# Patient Record
Sex: MALE | Race: WHITE | Employment: OTHER | URBAN - METROPOLITAN AREA
[De-identification: names, ages, dates, MRNs, and addresses within clinical notes are randomized per-mention and may not be internally consistent; named-entity substitution may affect disease eponyms.]

---

## 2018-02-21 ENCOUNTER — APPOINTMENT (OUTPATIENT)
Dept: GENERAL RADIOLOGY | Age: 58
End: 2018-02-21
Payer: MEDICARE

## 2018-02-21 ENCOUNTER — HOSPITAL ENCOUNTER (EMERGENCY)
Age: 58
Discharge: HOME | End: 2018-02-21
Attending: EMERGENCY MEDICINE
Payer: MEDICARE

## 2018-02-21 VITALS
TEMPERATURE: 98.1 F | OXYGEN SATURATION: 99 % | WEIGHT: 185 LBS | BODY MASS INDEX: 26.48 KG/M2 | HEART RATE: 86 BPM | RESPIRATION RATE: 15 BRPM | HEIGHT: 70 IN | SYSTOLIC BLOOD PRESSURE: 165 MMHG | DIASTOLIC BLOOD PRESSURE: 86 MMHG

## 2018-02-21 DIAGNOSIS — F10.90 HABITUAL ALCOHOL USE: ICD-10-CM

## 2018-02-21 DIAGNOSIS — F17.200 SMOKING ADDICTION: ICD-10-CM

## 2018-02-21 DIAGNOSIS — R60.0 BILATERAL LOWER EXTREMITY EDEMA: Primary | ICD-10-CM

## 2018-02-21 DIAGNOSIS — Z76.0 MEDICINE REFILL: ICD-10-CM

## 2018-02-21 LAB
-: NORMAL
ABSOLUTE EOS #: 0.3 K/UL (ref 0–0.4)
ABSOLUTE IMMATURE GRANULOCYTE: ABNORMAL K/UL (ref 0–0.3)
ABSOLUTE LYMPH #: 1.5 K/UL (ref 1–4.8)
ABSOLUTE MONO #: 0.7 K/UL (ref 0.1–1.3)
ALBUMIN SERPL-MCNC: 2 G/DL (ref 3.5–5.2)
ALBUMIN/GLOBULIN RATIO: ABNORMAL (ref 1–2.5)
ALP BLD-CCNC: 128 U/L (ref 40–129)
ALT SERPL-CCNC: 76 U/L (ref 5–41)
AMORPHOUS: NORMAL
ANION GAP SERPL CALCULATED.3IONS-SCNC: 6 MMOL/L (ref 9–17)
AST SERPL-CCNC: 174 U/L
BACTERIA: NORMAL
BASOPHILS # BLD: 1 % (ref 0–2)
BASOPHILS ABSOLUTE: 0.1 K/UL (ref 0–0.2)
BILIRUB SERPL-MCNC: 1.64 MG/DL (ref 0.3–1.2)
BILIRUBIN URINE: NEGATIVE
BNP INTERPRETATION: NORMAL
BUN BLDV-MCNC: 10 MG/DL (ref 6–20)
BUN/CREAT BLD: ABNORMAL (ref 9–20)
CALCIUM SERPL-MCNC: 8.3 MG/DL (ref 8.6–10.4)
CASTS UA: NORMAL /LPF
CHLORIDE BLD-SCNC: 103 MMOL/L (ref 98–107)
CO2: 29 MMOL/L (ref 20–31)
COLOR: YELLOW
COMMENT UA: ABNORMAL
CREAT SERPL-MCNC: 0.59 MG/DL (ref 0.7–1.2)
CRYSTALS, UA: NORMAL /HPF
DIFFERENTIAL TYPE: ABNORMAL
EKG ATRIAL RATE: 82 BPM
EKG P AXIS: 13 DEGREES
EKG P-R INTERVAL: 92 MS
EKG Q-T INTERVAL: 382 MS
EKG QRS DURATION: 94 MS
EKG QTC CALCULATION (BAZETT): 446 MS
EKG R AXIS: 74 DEGREES
EKG T AXIS: 53 DEGREES
EKG VENTRICULAR RATE: 82 BPM
EOSINOPHILS RELATIVE PERCENT: 6 % (ref 0–4)
EPITHELIAL CELLS UA: NORMAL /HPF
GFR AFRICAN AMERICAN: >60 ML/MIN
GFR NON-AFRICAN AMERICAN: >60 ML/MIN
GFR SERPL CREATININE-BSD FRML MDRD: ABNORMAL ML/MIN/{1.73_M2}
GFR SERPL CREATININE-BSD FRML MDRD: ABNORMAL ML/MIN/{1.73_M2}
GLUCOSE BLD-MCNC: 116 MG/DL (ref 70–99)
GLUCOSE URINE: NEGATIVE
HCT VFR BLD CALC: 44 % (ref 41–53)
HEMOGLOBIN: 15.2 G/DL (ref 13.5–17.5)
IMMATURE GRANULOCYTES: ABNORMAL %
KETONES, URINE: NEGATIVE
LEUKOCYTE ESTERASE, URINE: NEGATIVE
LYMPHOCYTES # BLD: 30 % (ref 24–44)
MCH RBC QN AUTO: 37.4 PG (ref 26–34)
MCHC RBC AUTO-ENTMCNC: 34.4 G/DL (ref 31–37)
MCV RBC AUTO: 108.7 FL (ref 80–100)
MONOCYTES # BLD: 14 % (ref 1–7)
MUCUS: NORMAL
NITRITE, URINE: NEGATIVE
NRBC AUTOMATED: ABNORMAL PER 100 WBC
OTHER OBSERVATIONS UA: NORMAL
PDW BLD-RTO: 15.6 % (ref 11.5–14.9)
PH UA: 7 (ref 5–8)
PLATELET # BLD: 102 K/UL (ref 150–450)
PLATELET ESTIMATE: ABNORMAL
PMV BLD AUTO: 7.7 FL (ref 6–12)
POTASSIUM SERPL-SCNC: 4 MMOL/L (ref 3.7–5.3)
PRO-BNP: 231 PG/ML
PROTEIN UA: ABNORMAL
RBC # BLD: 4.05 M/UL (ref 4.5–5.9)
RBC # BLD: ABNORMAL 10*6/UL
RBC UA: NORMAL /HPF
RENAL EPITHELIAL, UA: NORMAL /HPF
SEG NEUTROPHILS: 49 % (ref 36–66)
SEGMENTED NEUTROPHILS ABSOLUTE COUNT: 2.4 K/UL (ref 1.3–9.1)
SODIUM BLD-SCNC: 138 MMOL/L (ref 135–144)
SPECIFIC GRAVITY UA: 1.01 (ref 1–1.03)
TOTAL PROTEIN: 6.4 G/DL (ref 6.4–8.3)
TRICHOMONAS: NORMAL
TROPONIN INTERP: NORMAL
TROPONIN INTERP: NORMAL
TROPONIN T: <0.03 NG/ML
TROPONIN T: <0.03 NG/ML
TURBIDITY: CLEAR
URINE HGB: NEGATIVE
UROBILINOGEN, URINE: ABNORMAL
WBC # BLD: 4.9 K/UL (ref 3.5–11)
WBC # BLD: ABNORMAL 10*3/UL
WBC UA: NORMAL /HPF
YEAST: NORMAL

## 2018-02-21 PROCEDURE — 93005 ELECTROCARDIOGRAM TRACING: CPT

## 2018-02-21 PROCEDURE — 99284 EMERGENCY DEPT VISIT MOD MDM: CPT

## 2018-02-21 PROCEDURE — 36415 COLL VENOUS BLD VENIPUNCTURE: CPT

## 2018-02-21 PROCEDURE — 6360000002 HC RX W HCPCS: Performed by: EMERGENCY MEDICINE

## 2018-02-21 PROCEDURE — 84484 ASSAY OF TROPONIN QUANT: CPT

## 2018-02-21 PROCEDURE — 81001 URINALYSIS AUTO W/SCOPE: CPT

## 2018-02-21 PROCEDURE — 83880 ASSAY OF NATRIURETIC PEPTIDE: CPT

## 2018-02-21 PROCEDURE — 96374 THER/PROPH/DIAG INJ IV PUSH: CPT

## 2018-02-21 PROCEDURE — 80053 COMPREHEN METABOLIC PANEL: CPT

## 2018-02-21 PROCEDURE — 71046 X-RAY EXAM CHEST 2 VIEWS: CPT

## 2018-02-21 PROCEDURE — 85025 COMPLETE CBC W/AUTO DIFF WBC: CPT

## 2018-02-21 RX ORDER — FUROSEMIDE 10 MG/ML
40 INJECTION INTRAMUSCULAR; INTRAVENOUS ONCE
Status: COMPLETED | OUTPATIENT
Start: 2018-02-21 | End: 2018-02-21

## 2018-02-21 RX ORDER — LISINOPRIL 10 MG/1
10 TABLET ORAL DAILY
Qty: 15 TABLET | Refills: 0 | Status: SHIPPED | OUTPATIENT
Start: 2018-02-21 | End: 2018-03-08

## 2018-02-21 RX ADMIN — FUROSEMIDE 40 MG: 10 INJECTION, SOLUTION INTRAVENOUS at 19:07

## 2018-02-21 ASSESSMENT — ENCOUNTER SYMPTOMS
DIARRHEA: 0
NAUSEA: 0
BACK PAIN: 0
VOMITING: 0
ABDOMINAL PAIN: 0
COUGH: 0
SHORTNESS OF BREATH: 0
SORE THROAT: 0
EYE PAIN: 0

## 2018-02-21 ASSESSMENT — PAIN SCALES - GENERAL: PAINLEVEL_OUTOF10: 9

## 2018-02-21 NOTE — FLOWSHEET NOTE
02/21/18 1720   Encounter Summary   Services provided to: Patient   Referral/Consult From: Rounding   Continue Visiting (2/21/2018)   Complexity of Encounter Moderate   Length of Encounter 15 minutes   Routine   Type Initial   Assessment Calm   Intervention Sustaining presence/ Ministry of presence   Outcome Acceptance   Pt waiting to see the doctor. Chaplains will remain available to offer spiritual and emotional support as needed.

## 2018-02-21 NOTE — ED PROVIDER NOTES
patient has a history of daily drinking, possibly related to liver dysfunction and hypoalbuminemia. We'll get cardiac evaluation including BNP 4 CHF although less likely. EKG is normal, electrolytes show no gross abnormalities, LFTs are within normal limits. This is likely dependent edema. We will give one dose of Lasix here, provide follow up with PCP, compression stockings, return for development of new symptoms such as chest pain shortness of breath. Patient instructed to follow-up with PCP or return to the nearest emergency department for uncontrolled fevers, vomiting, shortness of breath, chest pain, or any other concerns. Patient acknowledges plan, voices understanding of it and is in agreement with it. CRITICAL CARE:   The patient admits to smoking. 3 minutes of time was spent discussing how this can worsen underlying breathing problems, COPD hypertension and heart disease or cause them to develop. CONSULTS:  None      FINAL IMPRESSION      1. Bilateral lower extremity edema    2. Smoking addiction    3. Habitual alcohol use    4.  Medicine refill          DISPOSITION/PLAN:  Discharge    PATIENT REFERRED TO:  [facility] ED  [facility] [address] [place] [id number]  [phone]  Go to   If symptoms worsen, As needed      DISCHARGE MEDICATIONS:  Discharge Medication List as of 2/21/2018  7:00 PM      START taking these medications    Details   Elastic Bandages & Supports ([address]) [address] PRN Starting Wed 2/21/2018, Disp-3 each, R-0, Print             (Please note that portions of this note were completed with a voice recognition program.  Efforts were made to edit the dictations but occasionally words are mis-transcribed.)    Yareli Cruz MD  Attending Emergency Physician            Yareli Cruz MD  02/23/18 [id number]

## 2020-07-10 ENCOUNTER — HOSPITAL ENCOUNTER (EMERGENCY)
Age: 60
Discharge: ANOTHER HOSPITAL | End: 2020-07-10
Attending: EMERGENCY MEDICINE
Payer: COMMERCIAL

## 2020-07-10 ENCOUNTER — APPOINTMENT (OUTPATIENT)
Dept: GENERAL RADIOLOGY | Age: 60
End: 2020-07-10
Payer: COMMERCIAL

## 2020-07-10 ENCOUNTER — APPOINTMENT (OUTPATIENT)
Dept: CT IMAGING | Age: 60
End: 2020-07-10
Payer: COMMERCIAL

## 2020-07-10 VITALS
HEIGHT: 70 IN | WEIGHT: 189 LBS | DIASTOLIC BLOOD PRESSURE: 56 MMHG | OXYGEN SATURATION: 100 % | HEART RATE: 72 BPM | BODY MASS INDEX: 27.06 KG/M2 | SYSTOLIC BLOOD PRESSURE: 128 MMHG | TEMPERATURE: 97.6 F | RESPIRATION RATE: 24 BRPM

## 2020-07-10 LAB
ABO/RH: NORMAL
ABSOLUTE EOS #: 0 K/UL (ref 0–0.4)
ABSOLUTE IMMATURE GRANULOCYTE: 0.07 K/UL (ref 0–0.3)
ABSOLUTE LYMPH #: 0.98 K/UL (ref 1–4.8)
ABSOLUTE MONO #: 0.63 K/UL (ref 0.2–0.8)
ALBUMIN SERPL-MCNC: 3.1 G/DL (ref 3.5–5.2)
ALBUMIN/GLOBULIN RATIO: ABNORMAL (ref 1–2.5)
ALP BLD-CCNC: 129 U/L (ref 40–129)
ALT SERPL-CCNC: 46 U/L (ref 5–41)
ANION GAP SERPL CALCULATED.3IONS-SCNC: 12 MMOL/L (ref 9–17)
ANION GAP SERPL CALCULATED.3IONS-SCNC: 14 MMOL/L (ref 9–17)
ANTIBODY SCREEN: NEGATIVE
ARM BAND NUMBER: NORMAL
AST SERPL-CCNC: 113 U/L
BASOPHILS # BLD: 0 %
BASOPHILS ABSOLUTE: 0 K/UL (ref 0–0.2)
BILIRUB SERPL-MCNC: 2.14 MG/DL (ref 0.3–1.2)
BNP INTERPRETATION: NORMAL
BUN BLDV-MCNC: 29 MG/DL (ref 8–23)
BUN BLDV-MCNC: 31 MG/DL (ref 8–23)
BUN/CREAT BLD: 12 (ref 9–20)
BUN/CREAT BLD: 13 (ref 9–20)
CALCIUM SERPL-MCNC: 8.5 MG/DL (ref 8.6–10.4)
CALCIUM SERPL-MCNC: 9.1 MG/DL (ref 8.6–10.4)
CHLORIDE BLD-SCNC: 94 MMOL/L (ref 98–107)
CHLORIDE BLD-SCNC: 94 MMOL/L (ref 98–107)
CO2: 20 MMOL/L (ref 20–31)
CO2: 22 MMOL/L (ref 20–31)
CREAT SERPL-MCNC: 2.36 MG/DL (ref 0.7–1.2)
CREAT SERPL-MCNC: 2.5 MG/DL (ref 0.7–1.2)
DIFFERENTIAL TYPE: ABNORMAL
EKG ATRIAL RATE: 92 BPM
EKG P AXIS: 62 DEGREES
EKG P-R INTERVAL: 152 MS
EKG Q-T INTERVAL: 360 MS
EKG QRS DURATION: 94 MS
EKG QTC CALCULATION (BAZETT): 445 MS
EKG R AXIS: 79 DEGREES
EKG T AXIS: 82 DEGREES
EKG VENTRICULAR RATE: 92 BPM
EOSINOPHILS RELATIVE PERCENT: 0 % (ref 1–4)
ETHANOL PERCENT: <0.01 %
ETHANOL: <10 MG/DL
EXPIRATION DATE: NORMAL
GFR AFRICAN AMERICAN: 32 ML/MIN
GFR AFRICAN AMERICAN: 34 ML/MIN
GFR NON-AFRICAN AMERICAN: 26 ML/MIN
GFR NON-AFRICAN AMERICAN: 28 ML/MIN
GFR SERPL CREATININE-BSD FRML MDRD: ABNORMAL ML/MIN/{1.73_M2}
GLUCOSE BLD-MCNC: 117 MG/DL (ref 70–99)
GLUCOSE BLD-MCNC: 181 MG/DL (ref 70–99)
HCT VFR BLD CALC: 39 % (ref 40.7–50.3)
HEMOGLOBIN: 13.6 G/DL (ref 13–17)
IMMATURE GRANULOCYTES: 1 %
INR BLD: 1.4
LACTIC ACID: 2.7 MMOL/L (ref 0.5–2.2)
LYMPHOCYTES # BLD: 14 % (ref 24–44)
MCH RBC QN AUTO: 39.9 PG (ref 25.2–33.5)
MCHC RBC AUTO-ENTMCNC: 34.9 G/DL (ref 28.4–34.8)
MCV RBC AUTO: 114.4 FL (ref 82.6–102.9)
MONOCYTES # BLD: 9 % (ref 1–7)
MORPHOLOGY: ABNORMAL
NRBC AUTOMATED: 0 PER 100 WBC
PARTIAL THROMBOPLASTIN TIME: 34.5 SEC (ref 23.9–33.8)
PDW BLD-RTO: 14.8 % (ref 11.8–14.4)
PLATELET # BLD: 134 K/UL (ref 138–453)
PLATELET ESTIMATE: ABNORMAL
PMV BLD AUTO: 9.4 FL (ref 8.1–13.5)
POTASSIUM SERPL-SCNC: 5.5 MMOL/L (ref 3.7–5.3)
POTASSIUM SERPL-SCNC: 6.1 MMOL/L (ref 3.7–5.3)
PRO-BNP: 196 PG/ML
PROTHROMBIN TIME: 16.7 SEC (ref 11.5–14.2)
RBC # BLD: 3.41 M/UL (ref 4.21–5.77)
RBC # BLD: ABNORMAL 10*6/UL
SARS-COV-2, PCR: NORMAL
SARS-COV-2, RAPID: NOT DETECTED
SARS-COV-2: NORMAL
SEG NEUTROPHILS: 76 % (ref 36–66)
SEGMENTED NEUTROPHILS ABSOLUTE COUNT: 5.32 K/UL (ref 1.8–7.7)
SODIUM BLD-SCNC: 128 MMOL/L (ref 135–144)
SODIUM BLD-SCNC: 128 MMOL/L (ref 135–144)
SOURCE: NORMAL
TOTAL PROTEIN: 6.9 G/DL (ref 6.4–8.3)
TROPONIN INTERP: ABNORMAL
TROPONIN INTERP: ABNORMAL
TROPONIN T: ABNORMAL NG/ML
TROPONIN T: ABNORMAL NG/ML
TROPONIN, HIGH SENSITIVITY: 29 NG/L (ref 0–22)
TROPONIN, HIGH SENSITIVITY: 34 NG/L (ref 0–22)
WBC # BLD: 7 K/UL (ref 3.5–11.3)
WBC # BLD: ABNORMAL 10*3/UL

## 2020-07-10 PROCEDURE — 85025 COMPLETE CBC W/AUTO DIFF WBC: CPT

## 2020-07-10 PROCEDURE — 96361 HYDRATE IV INFUSION ADD-ON: CPT

## 2020-07-10 PROCEDURE — 6370000000 HC RX 637 (ALT 250 FOR IP): Performed by: EMERGENCY MEDICINE

## 2020-07-10 PROCEDURE — 80048 BASIC METABOLIC PNL TOTAL CA: CPT

## 2020-07-10 PROCEDURE — 86900 BLOOD TYPING SEROLOGIC ABO: CPT

## 2020-07-10 PROCEDURE — G0480 DRUG TEST DEF 1-7 CLASSES: HCPCS

## 2020-07-10 PROCEDURE — 85610 PROTHROMBIN TIME: CPT

## 2020-07-10 PROCEDURE — 93005 ELECTROCARDIOGRAM TRACING: CPT | Performed by: EMERGENCY MEDICINE

## 2020-07-10 PROCEDURE — 80053 COMPREHEN METABOLIC PANEL: CPT

## 2020-07-10 PROCEDURE — 2580000003 HC RX 258: Performed by: EMERGENCY MEDICINE

## 2020-07-10 PROCEDURE — 71045 X-RAY EXAM CHEST 1 VIEW: CPT

## 2020-07-10 PROCEDURE — 83880 ASSAY OF NATRIURETIC PEPTIDE: CPT

## 2020-07-10 PROCEDURE — 6360000002 HC RX W HCPCS: Performed by: EMERGENCY MEDICINE

## 2020-07-10 PROCEDURE — 86901 BLOOD TYPING SEROLOGIC RH(D): CPT

## 2020-07-10 PROCEDURE — 83605 ASSAY OF LACTIC ACID: CPT

## 2020-07-10 PROCEDURE — 85730 THROMBOPLASTIN TIME PARTIAL: CPT

## 2020-07-10 PROCEDURE — 2500000003 HC RX 250 WO HCPCS: Performed by: EMERGENCY MEDICINE

## 2020-07-10 PROCEDURE — 96375 TX/PRO/DX INJ NEW DRUG ADDON: CPT

## 2020-07-10 PROCEDURE — 74176 CT ABD & PELVIS W/O CONTRAST: CPT

## 2020-07-10 PROCEDURE — 99285 EMERGENCY DEPT VISIT HI MDM: CPT

## 2020-07-10 PROCEDURE — 84484 ASSAY OF TROPONIN QUANT: CPT

## 2020-07-10 PROCEDURE — 93010 ELECTROCARDIOGRAM REPORT: CPT | Performed by: INTERNAL MEDICINE

## 2020-07-10 PROCEDURE — 96365 THER/PROPH/DIAG IV INF INIT: CPT

## 2020-07-10 PROCEDURE — U0002 COVID-19 LAB TEST NON-CDC: HCPCS

## 2020-07-10 PROCEDURE — 86850 RBC ANTIBODY SCREEN: CPT

## 2020-07-10 RX ORDER — MORPHINE SULFATE 4 MG/ML
4 INJECTION, SOLUTION INTRAMUSCULAR; INTRAVENOUS ONCE
Status: COMPLETED | OUTPATIENT
Start: 2020-07-10 | End: 2020-07-10

## 2020-07-10 RX ORDER — 0.9 % SODIUM CHLORIDE 0.9 %
1000 INTRAVENOUS SOLUTION INTRAVENOUS ONCE
Status: COMPLETED | OUTPATIENT
Start: 2020-07-10 | End: 2020-07-10

## 2020-07-10 RX ORDER — SODIUM CHLORIDE 9 MG/ML
INJECTION, SOLUTION INTRAVENOUS CONTINUOUS
Status: DISCONTINUED | OUTPATIENT
Start: 2020-07-10 | End: 2020-07-10 | Stop reason: HOSPADM

## 2020-07-10 RX ORDER — DEXTROSE MONOHYDRATE 25 G/50ML
25 INJECTION, SOLUTION INTRAVENOUS ONCE
Status: COMPLETED | OUTPATIENT
Start: 2020-07-10 | End: 2020-07-10

## 2020-07-10 RX ORDER — 0.9 % SODIUM CHLORIDE 0.9 %
1000 INTRAVENOUS SOLUTION INTRAVENOUS ONCE
Status: DISCONTINUED | OUTPATIENT
Start: 2020-07-10 | End: 2020-07-10 | Stop reason: HOSPADM

## 2020-07-10 RX ORDER — 0.9 % SODIUM CHLORIDE 0.9 %
1000 INTRAVENOUS SOLUTION INTRAVENOUS ONCE
Status: DISCONTINUED | OUTPATIENT
Start: 2020-07-10 | End: 2020-07-10

## 2020-07-10 RX ORDER — SODIUM POLYSTYRENE SULFONATE 4.1 MEQ/G
30 POWDER, FOR SUSPENSION ORAL; RECTAL ONCE
Status: COMPLETED | OUTPATIENT
Start: 2020-07-10 | End: 2020-07-10

## 2020-07-10 RX ORDER — ONDANSETRON 2 MG/ML
4 INJECTION INTRAMUSCULAR; INTRAVENOUS ONCE
Status: COMPLETED | OUTPATIENT
Start: 2020-07-10 | End: 2020-07-10

## 2020-07-10 RX ADMIN — Medication 10 UNITS: at 18:23

## 2020-07-10 RX ADMIN — SODIUM POLYSTYRENE SULFONATE 30 G: 1 POWDER ORAL; RECTAL at 19:22

## 2020-07-10 RX ADMIN — SODIUM CHLORIDE: 9 INJECTION, SOLUTION INTRAVENOUS at 19:13

## 2020-07-10 RX ADMIN — SODIUM CHLORIDE 1000 ML: 9 INJECTION, SOLUTION INTRAVENOUS at 17:28

## 2020-07-10 RX ADMIN — ONDANSETRON 4 MG: 2 INJECTION INTRAMUSCULAR; INTRAVENOUS at 17:26

## 2020-07-10 RX ADMIN — SODIUM BICARBONATE 25 MEQ: 84 INJECTION, SOLUTION INTRAVENOUS at 18:25

## 2020-07-10 RX ADMIN — SODIUM CHLORIDE 1000 ML: 9 INJECTION, SOLUTION INTRAVENOUS at 18:26

## 2020-07-10 RX ADMIN — CALCIUM GLUCONATE 1 G: 98 INJECTION, SOLUTION INTRAVENOUS at 18:25

## 2020-07-10 RX ADMIN — MORPHINE SULFATE 4 MG: 4 INJECTION, SOLUTION INTRAMUSCULAR; INTRAVENOUS at 17:26

## 2020-07-10 RX ADMIN — SODIUM CHLORIDE 1000 ML: 9 INJECTION, SOLUTION INTRAVENOUS at 19:02

## 2020-07-10 RX ADMIN — DEXTROSE MONOHYDRATE 25 G: 25 INJECTION, SOLUTION INTRAVENOUS at 18:24

## 2020-07-10 ASSESSMENT — PAIN DESCRIPTION - FREQUENCY: FREQUENCY: CONTINUOUS

## 2020-07-10 ASSESSMENT — PAIN DESCRIPTION - ONSET: ONSET: ON-GOING

## 2020-07-10 ASSESSMENT — PAIN SCALES - GENERAL
PAINLEVEL_OUTOF10: 10
PAINLEVEL_OUTOF10: 10

## 2020-07-10 ASSESSMENT — PAIN DESCRIPTION - PAIN TYPE: TYPE: ACUTE PAIN

## 2020-07-10 ASSESSMENT — PAIN DESCRIPTION - ORIENTATION: ORIENTATION: MID;LOWER

## 2020-07-10 ASSESSMENT — PAIN DESCRIPTION - DESCRIPTORS: DESCRIPTORS: SHARP;STABBING

## 2020-07-10 ASSESSMENT — PAIN DESCRIPTION - PROGRESSION: CLINICAL_PROGRESSION: NOT CHANGED

## 2020-07-10 ASSESSMENT — PAIN DESCRIPTION - LOCATION: LOCATION: ABDOMEN

## 2020-07-10 NOTE — ED NOTES
Per Dr. Kiara Ortiz pt is to be transferred to [facility] d/t surgery need after discussion with Dr. Kirsten Ritchie. Pt remains on telemetry, Dr. Kiara Ortiz to bedside for treatment plan.        Bal Gillespie RN  07/10/20 [id number]

## 2020-07-10 NOTE — ED PROVIDER NOTES
ADDENDUM:        Care of this patient was assumed from Dr. Watson Duane    At 1800      . The patient was seen for Abdominal Pain (mid-lower abd); Diarrhea; and Shortness of Breath  . The patient's initial evaluation and plan have been discussed with the prior provider who initially evaluated the patient. Nursing Notes, Past Medical Hx, Past Surgical Hx, Social Hx, Allergies, and Family Hx were all reviewed. I performed a repeat evaluation of the patient and reviewed tests completed so far. Patient is a 69-year-old male was endorsed to me by Dr. mckinney pending CT abdomen pelvis and admission. Patient was evaluated Dr. Watson Duane with findings of hyperkalemia acute kidney injury, patient had complaints of abdominal pain. Medications to treat hyperkalemia: Gluconate, insulin, dextrose. CT scan concerning for subscapular hemorrhage with small to moderate hemoperitoneum. Patient typed and crossed matched for blood, he received additional fluids. Patient discussed with Dr. Freddy Zhang his primary care doctor agreed to admit patient to the ICU. Requested Dr. Ritu James with nephrology service discussed with nephrology with recommendation for initiating of Kayexalate repeat BMP. Potassium 5.7. Surgical service paged discussed with Dr. Shanthi Alfaro with the surgery service made aware of patient's CT findings he recommended patient be transferred to [place] he is in need of a hepatobiliary specialist.  Given this patient was discussed with Dr. Tomas Olvera with the surgery service at [facility] agreed for patient to be transferred there for further evaluation treatment. Transfer documentation completed. Radiology contacted to get imaging on disc. Chart updated as well. Patient in agreement with treatment plan. ED Course     ED Course as of Jul 10 2030   Fri Jul 10, 2020   1750 Patient is responding to fluids. Current BP 96/59.     [TALI]   1952 Dr. Freddy Zhang and Dr. Moncada Pages updated on patient being transferred to [facility]. acute cardiopulmonary process. 2. Bullous emphysema. LABS: All lab results were reviewed by myself, and all abnormals are listed below.   Labs Reviewed   CBC WITH AUTO DIFFERENTIAL - Abnormal; Notable for the following components:       Result Value    RBC 3.41 (*)     Hematocrit 39.0 (*)     .4 (*)     MCH 39.9 (*)     MCHC 34.9 (*)     RDW 14.8 (*)     Platelets 136 (*)     Seg Neutrophils 76 (*)     Lymphocytes 14 (*)     Monocytes 9 (*)     Eosinophils % 0 (*)     Immature Granulocytes 1 (*)     Absolute Lymph # 0.98 (*)     All other components within normal limits   COMPREHENSIVE METABOLIC PANEL W/ REFLEX TO MG FOR LOW K - Abnormal; Notable for the following components:    Glucose 117 (*)     BUN 29 (*)     CREATININE 2.50 (*)     Sodium 128 (*)     Potassium 6.1 (*)     Chloride 94 (*)     ALT 46 (*)      (*)     Total Bilirubin 2.14 (*)     Alb 3.1 (*)     GFR Non- 26 (*)     GFR  32 (*)     All other components within normal limits   TROPONIN - Abnormal; Notable for the following components:    Troponin, High Sensitivity 34 (*)     All other components within normal limits   LACTIC ACID - Abnormal; Notable for the following components:    Lactic Acid 2.7 (*)     All other components within normal limits   TROPONIN - Abnormal; Notable for the following components:    Troponin, High Sensitivity 29 (*)     All other components within normal limits   BASIC METABOLIC PANEL W/ REFLEX TO MG FOR LOW K - Abnormal; Notable for the following components:    Glucose 181 (*)     BUN 31 (*)     CREATININE 2.36 (*)     Calcium 8.5 (*)     Sodium 128 (*)     Potassium 5.5 (*)     Chloride 94 (*)     GFR Non- 28 (*)     GFR  34 (*)     All other components within normal limits   PROTIME-INR - Abnormal; Notable for the following components:    Protime 16.7 (*)     All other components within normal limits   APTT - Abnormal; Notable

## 2020-07-11 NOTE — FLOWSHEET NOTE
07/10/20 2040   Handoff   Communication Given Transfer Handoff   Oncoming Nurse/Offgoing Nurse Jorge Olivo RN  ([id number] [facility])   Handoff Communication Face to Face   Time Handoff Given 2040   End of Shift Check Performed Yes     Report given face to face @ bedside for transport. CT scan disc, and chart summary printed for transport. VS and patient stable upon transfer.   Report telephoned to [facility], RN @ [facility] ICU

## 2020-07-12 ENCOUNTER — CARE COORDINATION (OUTPATIENT)
Dept: CARE COORDINATION | Age: 60
End: 2020-07-12

## 2020-07-15 NOTE — ED PROVIDER NOTES
EMERGENCY DEPARTMENT ENCOUNTER    Pt Name: Juancarlos Casey  MRN: [id number]  [place] 1960  Date of evaluation: 7/14/20  CHIEF COMPLAINT       Chief Complaint   Patient presents with    Abdominal Pain     mid-lower abd    Diarrhea    Shortness of Breath     HISTORY OF PRESENT ILLNESS   Patient is a 69-year-old male with PMH of hypertension, COPD, and hepatitis C who presents to the ED complaining of shortness of breath, abdominal pain and diarrhea. Symptoms started 2 days ago. Abdominal pain located lower abdomen, nonradiating, rated 5/10, tolerable. No fevers, cough, chest pain, nausea, vomiting, changes in urine. REVIEW OF SYSTEMS     Review of Systems   All other systems reviewed and are negative.     PASTMEDICAL HISTORY     Past Medical History:   Diagnosis Date    COPD (chronic obstructive pulmonary disease) ([facility] [address]     Hepatitis C     Hypertension     Peripheral edema      SURGICAL HISTORY       Past Surgical History:   Procedure Laterality Date    CHOLECYSTECTOMY      FRACTURE SURGERY      LUNG REMOVAL, PARTIAL Left      CURRENT MEDICATIONS       Discharge Medication List as of 7/10/2020  8:42 PM      CONTINUE these medications which have NOT CHANGED    Details   Elastic Bandages & Supports (MEDICAL COMPRESSION STOCKINGS) [address] [place] [address] PRN Starting Wed 2/21/2018, Disp-3 each, R-0, Print      lisinopril (PRINIVIL;ZESTRIL) 10 MG tablet Take 1 tablet by mouth daily for 15 days, Disp-15 tablet, R-0Print      VENTOLIN  (90 BASE) MCG/ACT inhaler Inhale 2 puffs into the lungs every 4 hours as needed for Wheezing, Disp-1 Inhaler, R-3, CINTHIA      spironolactone (ALDACTONE) 100 MG tablet Take 1 tablet by mouth daily, Disp-30 tablet, R-3      nicotine (NICODERM CQ) 21 MG/24HR Place 1 patch onto the skin daily, Disp-30 patch, R-3      furosemide (LASIX) 40 MG tablet Take 40 mg by mouth daily      montelukast (SINGULAIR) 10 MG tablet Take 10 mg by mouth nightly      SPIRIVA HANDIHALER 18 MCG Co-signs this chart in the absence of a cardiologist.        RADIOLOGY:All plain film, CT, MRI, and formal ultrasound images (except ED bedside ultrasound) are read by the radiologist, see reports below, unless otherwisenoted in MDM or here. CT ABDOMEN PELVIS WO CONTRAST Additional Contrast? None   Final Result   1. Adjacent new hypodense lesions in the subcapsular right hemiliver, the   larger more inferior lesion measuring up to 14.3 cm in greatest dimension. Both lesions are suspicious for hepatocellular carcinoma given cirrhosis. There is subcapsular hemorrhage associated with the larger lesion as well as   small to moderate hemoperitoneum. Consider abdomen CTA to determine the   source of bleeding. Recommend further evaluation with liver protocol abdomen   MRI (preferred, utilizing a nonhepatobiliary contrast agent) or CT on a   nonemergent basis following cessation of bleeding. Critical results were   called by Dr. Efren Herman MD to Dr. Krista Pollack on 7/10/2020 at 18:36.   2. Bronchial secretions in the right lower lobe could be due to bronchitis or   aspiration. No associated pulmonary abnormalities are identified. 3. Bullous emphysema. XR CHEST PORTABLE   Final Result   1. No acute cardiopulmonary process. 2. Bullous emphysema. LABS: All lab results were reviewed by myself, and all abnormals are listed below.   Labs Reviewed   CBC WITH AUTO DIFFERENTIAL - Abnormal; Notable for the following components:       Result Value    RBC 3.41 (*)     Hematocrit 39.0 (*)     .4 (*)     MCH 39.9 (*)     MCHC 34.9 (*)     RDW 14.8 (*)     Platelets 751 (*)     Seg Neutrophils 76 (*)     Lymphocytes 14 (*)     Monocytes 9 (*)     Eosinophils % 0 (*)     Immature Granulocytes 1 (*)     Absolute Lymph # 0.98 (*)     All other components within normal limits   COMPREHENSIVE METABOLIC PANEL W/ REFLEX TO MG FOR LOW K - Abnormal; Notable for the following components:    Glucose 117 (*) BUN 29 (*)     CREATININE 2.50 (*)     Sodium 128 (*)     Potassium 6.1 (*)     Chloride 94 (*)     ALT 46 (*)      (*)     Total Bilirubin 2.14 (*)     Alb 3.1 (*)     GFR Non- 26 (*)     GFR  32 (*)     All other components within normal limits   TROPONIN - Abnormal; Notable for the following components:    Troponin, High Sensitivity 34 (*)     All other components within normal limits   LACTIC ACID - Abnormal; Notable for the following components:    Lactic Acid 2.7 (*)     All other components within normal limits   TROPONIN - Abnormal; Notable for the following components:    Troponin, High Sensitivity 29 (*)     All other components within normal limits   BASIC METABOLIC PANEL W/ REFLEX TO MG FOR LOW K - Abnormal; Notable for the following components:    Glucose 181 (*)     BUN 31 (*)     CREATININE 2.36 (*)     Calcium 8.5 (*)     Sodium 128 (*)     Potassium 5.5 (*)     Chloride 94 (*)     GFR Non- 28 (*)     GFR  34 (*)     All other components within normal limits   PROTIME-INR - Abnormal; Notable for the following components:    Protime 16.7 (*)     All other components within normal limits   APTT - Abnormal; Notable for the following components:    PTT 34.5 (*)     All other components within normal limits   BRAIN NATRIURETIC PEPTIDE   COVID-19   ETHANOL   TYPE AND SCREEN       EMERGENCY DEPARTMENTCOURSE:   Care signed out to Dr. Agustina Ramirez, refer to her note for diagnosis and disposition.       Vitals:    Vitals:    07/10/20 1943 07/10/20 1954 07/10/20 1958 07/10/20 2013   BP: 131/62  100/69 (!) 128/56   Pulse: 78 75 73 72   Resp:       Temp:       TempSrc:       SpO2: 98% 100% 100% 100%   Weight:       Height:           The patient was given the following medications while in the emergency department:  Orders Placed This Encounter   Medications    0.9 % sodium chloride bolus    morphine sulfate (PF) injection 4 mg    ondansetron [facility]) injection 4 mg    calcium gluconate 1 g in dextrose 5 % 100 mL IVPB    insulin regular (HUMULIN R;NOVOLIN R) injection 10 Units    dextrose 50 % IV solution    sodium bicarbonate 8.4 % injection 25 mEq    0.9 % sodium chloride bolus    DISCONTD: 0.9 % sodium chloride bolus    DISCONTD: 0.9 % sodium chloride bolus    sodium polystyrene (KAYEXALATE) powder 30 g    DISCONTD: 0.9 % sodium chloride infusion     CONSULTS:  IP CONSULT TO INTERNAL MEDICINE  IP CONSULT TO GENERAL SURGERY  IP CONSULT TO INTERNAL MEDICINE  IP CONSULT TO NEPHROLOGY    FINAL IMPRESSION      1. ANKUSH (acute kidney injury) ([facility] [address]    2. Hyperkalemia    3. Hemoperitoneum    4. Liver lesion          DISPOSITION/PLAN   DISPOSITION Decision To Transfer 07/10/2020 07:02:13 PM      PATIENT REFERRED TO:  No follow-up provider specified.   DISCHARGE MEDICATIONS:  Discharge Medication List as of 7/10/2020  8:42 PM        Tabby Omalley MD  Attending Emergency Physician                   Felice Sunshine MD  07/14/20 2006